# Patient Record
Sex: FEMALE | Race: BLACK OR AFRICAN AMERICAN | NOT HISPANIC OR LATINO
[De-identification: names, ages, dates, MRNs, and addresses within clinical notes are randomized per-mention and may not be internally consistent; named-entity substitution may affect disease eponyms.]

---

## 2020-02-06 DIAGNOSIS — Z01.818 ENCOUNTER FOR OTHER PREPROCEDURAL EXAMINATION: ICD-10-CM

## 2020-02-06 DIAGNOSIS — E66.01 MORBID (SEVERE) OBESITY DUE TO EXCESS CALORIES: ICD-10-CM

## 2020-02-10 ENCOUNTER — APPOINTMENT (OUTPATIENT)
Dept: SURGERY | Facility: CLINIC | Age: 45
End: 2020-02-10

## 2022-05-16 ENCOUNTER — NON-APPOINTMENT (OUTPATIENT)
Age: 47
End: 2022-05-16

## 2022-08-14 ENCOUNTER — EMERGENCY (EMERGENCY)
Facility: HOSPITAL | Age: 47
LOS: 0 days | Discharge: HOME | End: 2022-08-14
Attending: STUDENT IN AN ORGANIZED HEALTH CARE EDUCATION/TRAINING PROGRAM

## 2022-08-14 VITALS
HEIGHT: 68 IN | SYSTOLIC BLOOD PRESSURE: 153 MMHG | TEMPERATURE: 99 F | HEART RATE: 70 BPM | RESPIRATION RATE: 18 BRPM | WEIGHT: 253.09 LBS | OXYGEN SATURATION: 96 % | DIASTOLIC BLOOD PRESSURE: 87 MMHG
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
## 2022-08-14 DIAGNOSIS — M25.571 PAIN IN RIGHT ANKLE AND JOINTS OF RIGHT FOOT: ICD-10-CM

## 2022-08-14 DIAGNOSIS — S99.911A UNSPECIFIED INJURY OF RIGHT ANKLE, INITIAL ENCOUNTER: ICD-10-CM

## 2022-08-14 DIAGNOSIS — I10 ESSENTIAL (PRIMARY) HYPERTENSION: ICD-10-CM

## 2022-08-14 DIAGNOSIS — Z91.018 ALLERGY TO OTHER FOODS: ICD-10-CM
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
## 2022-08-14 PROCEDURE — 73610 X-RAY EXAM OF ANKLE: CPT | Mod: 26,RT

## 2022-08-14 PROCEDURE — 73630 X-RAY EXAM OF FOOT: CPT | Mod: 26,RT

## 2022-08-14 PROCEDURE — 99283 EMERGENCY DEPT VISIT LOW MDM: CPT

## 2022-08-14 NOTE — ED PROVIDER NOTE - CLINICAL SUMMARY MEDICAL DECISION MAKING FREE TEXT BOX
45 y/o F presenting to the ED s/p fall c/o R ankle injury. Vitals stable. She is well appearing in NAD. Her right ankle appears stable with no significant swelling or ecchymosis. Will obtain XR R ankle/foot, low suspicion of fx.

## 2022-08-14 NOTE — ED ADULT NURSE NOTE - OBJECTIVE STATEMENT
2020 11:00 pt presents to ed a&ox3 pt s/p fall down 5 steps lastnight , pt c/o right ankle soreness, no pain currently. pt denies head injury. No swelling or deformity noted

## 2022-08-14 NOTE — ED PROVIDER NOTE - PHYSICAL EXAMINATION
GENERAL: Awake, alert, NAD  HEENT: NC/AT, moist mucous membranes  LUNGS: CTAB, no wheezes or crackles   CARDIAC: RRR, no m/r/g  EXT: No edema, no calf tenderness, 2+ DP pulses, no deformities, no significant swelling or tenderness or ecchymosis  NEURO: A&Ox3. Moving all extremities.  SKIN: Warm and dry. No rash.  PSYCH: Normal affect.

## 2022-08-14 NOTE — ED ADULT NURSE NOTE - NSIMPLEMENTINTERV_GEN_ALL_ED
Implemented All Universal Safety Interventions:  [place] to call system. Call bell, personal items and telephone within reach. Instruct patient to call for assistance. Room bathroom lighting operational. Non-slip footwear when patient is off stretcher. Physically safe environment: no spills, clutter or unnecessary equipment. Stretcher in lowest position, wheels locked, appropriate side rails in place.

## 2022-08-14 NOTE — ED PROVIDER NOTE - IV ALTEPLASE DOOR HIDDEN
Thanks for visiting us today!    Remember these important phone numbers:    [phone] for phone nurses during the day and our nurse answering service at night    [phone]  for scheduling or changing future appointments    [phone] for the Poison Control Center    When leaving a message for our staff, please include:   • the spelling of your child’s full name and date of birth  • your full name and relationship to child  • best phone number and time to reach you   • reason for the call      Is your child signed up for [employer]? If you do this, you can message us rather than playing phone tag! You can also look at labs, pay your bills, and do some scheduling. Go to your own account first. (If you don't have one yet, you can set one up at the website below or at your doctor's office).  Using a web browser (not the phone gómez), on the right hand side of your page, click the button marked \"Request Access to my Child's Records.\" Fill out the information. In a few days your child's information will be linked to your account. It's that simple!! Here is the website for more information:     [email or web address]      If you haven't already liked us on Facebook, please do so!  Just search for \"[facility]\"      --------------------------------------------------------------------------------------------------------------------    Moisturizing ointment/creams - Apply moisturizer to entire body, 2 times daily. The moisturizer should be unscented. Examples include petroleum jelly (Vaseline), Aquaphor, Cetaphil, Eucerin (generic versions of these moisturizers are available) or CeraVe Baby.             
show

## 2022-08-14 NOTE — ED PROVIDER NOTE - NSFOLLOWUPINSTRUCTIONS_ED_ALL_ED_FT
You were seen in the ED for an ankle injury.    Your xray did not show any acute fracture.    You can continue to use muscle relaxer as needed at home. You can also use tylenol or motrin for pain.    Please follow up with your primary care doctor when you return home.    If you experience worsening swelling, pain, inability to walk, return to the ED.

## 2022-08-14 NOTE — ED PROVIDER NOTE - NSDCPRINTRESULTS_ED_ALL_ED
Problem: Falls - Risk of:  Goal: Will remain free from falls  Description: Will remain free from falls  7/14/2021 2131 by Mark Walker RN  Outcome: Ongoing  7/14/2021 1815 by Marion Reynaga RN  Outcome: Ongoing  Goal: Absence of physical injury  Description: Absence of physical injury  7/14/2021 2131 by Mark Walker RN  Outcome: Ongoing  7/14/2021 1815 by Marion Reynaga RN  Outcome: Ongoing     Problem: Skin Integrity:  Goal: Will show no infection signs and symptoms  Description: Will show no infection signs and symptoms  7/14/2021 2131 by Mark Walker RN  Outcome: Ongoing  7/14/2021 1815 by Marion Reynaga RN  Outcome: Ongoing  Goal: Absence of new skin breakdown  Description: Absence of new skin breakdown  7/14/2021 2131 by Mark Walker RN  Outcome: Ongoing  7/14/2021 1815 by Marion Reynaga RN  Outcome: Ongoing     Problem: Nutrition  Goal: Optimal nutrition therapy  7/14/2021 2131 by Mark Walker RN  Outcome: Ongoing  7/14/2021 1815 by Marion Reynaga RN  Outcome: Ongoing  Goal: Understanding of nutritional guidelines  7/14/2021 2131 by Mark Walker RN  Outcome: Ongoing  7/14/2021 1815 by Marion Reynaga RN  Outcome: Ongoing Patient requests all Lab, Cardiology, and Radiology Results on their Discharge Instructions DISPLAY PLAN FREE TEXT

## 2022-08-14 NOTE — ED PROVIDER NOTE - PATIENT PORTAL LINK FT
You can access the FollowMyHealth Patient Portal offered by [facility] by registering at the following website: [email or web address]. By joining [employer] [facility]’s FollowMyHealth portal, you will also be able to view your health information using other applications (apps) compatible with our system.

## 2022-08-14 NOTE — ED PROVIDER NOTE - OBJECTIVE STATEMENT
47 y/o F with PMH HTN presenting to the ED s/p fall. She tripped over 5 stairs last night and twisted her R ankle. She is now endorsing difficulty walking on the R ankle secondary to pain. She has been able to bear weight with some difficulty. States she feels like the muscle is strained. She has been using muscle relaxers with some relief. No numbness, tingling, weakness.

## 2023-08-18 ENCOUNTER — NON-APPOINTMENT (OUTPATIENT)
Age: 48
End: 2023-08-18

## 2023-08-21 ENCOUNTER — EMERGENCY (EMERGENCY)
Facility: HOSPITAL | Age: 48
LOS: 0 days | Discharge: HOME | End: 2023-08-21
Payer: MEDICAID

## 2023-08-21 VITALS
SYSTOLIC BLOOD PRESSURE: 133 MMHG | OXYGEN SATURATION: 99 % | HEART RATE: 76 BPM | TEMPERATURE: 99 F | RESPIRATION RATE: 18 BRPM | DIASTOLIC BLOOD PRESSURE: 88 MMHG

## 2023-08-21 VITALS
DIASTOLIC BLOOD PRESSURE: 90 MMHG | OXYGEN SATURATION: 99 % | HEIGHT: 69 IN | TEMPERATURE: 99 F | WEIGHT: 240.97 LBS | SYSTOLIC BLOOD PRESSURE: 165 MMHG | RESPIRATION RATE: 16 BRPM | HEART RATE: 75 BPM
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
## 2023-08-21 DIAGNOSIS — M25.571 PAIN IN RIGHT ANKLE AND JOINTS OF RIGHT FOOT: ICD-10-CM

## 2023-08-21 DIAGNOSIS — J45.909 UNSPECIFIED ASTHMA, UNCOMPLICATED: ICD-10-CM
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
## 2023-08-21 DIAGNOSIS — I10 ESSENTIAL (PRIMARY) HYPERTENSION: ICD-10-CM

## 2023-08-21 DIAGNOSIS — S82.841A DISPLACED BIMALLEOLAR FRACTURE OF RIGHT LOWER LEG, INITIAL ENCOUNTER FOR CLOSED FRACTURE: ICD-10-CM

## 2023-08-21 DIAGNOSIS — Z79.82 LONG TERM (CURRENT) USE OF ASPIRIN: ICD-10-CM
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
## 2023-08-21 PROCEDURE — 73700 CT LOWER EXTREMITY W/O DYE: CPT | Mod: 26,RT,MG

## 2023-08-21 PROCEDURE — G1004: CPT

## 2023-08-21 PROCEDURE — 73610 X-RAY EXAM OF ANKLE: CPT | Mod: 26,RT

## 2023-08-21 PROCEDURE — 73600 X-RAY EXAM OF ANKLE: CPT | Mod: 26,RT,59,76

## 2023-08-21 PROCEDURE — 99285 EMERGENCY DEPT VISIT HI MDM: CPT

## 2023-08-21 RX ORDER — ASPIRIN/CALCIUM CARB/MAGNESIUM 324 MG
1 TABLET ORAL
Qty: 30 | Refills: 0
Start: 2023-08-21

## 2023-08-21 RX ORDER — IBUPROFEN 200 MG
600 TABLET ORAL ONCE
Refills: 0 | Status: COMPLETED | OUTPATIENT
Start: 2023-08-21 | End: 2023-08-21

## 2023-08-21 RX ADMIN — Medication 600 MILLIGRAM(S): at 13:20

## 2023-08-21 NOTE — ED PROVIDER NOTE - PROVIDER TOKENS
FREE:[LAST:[your pmd in 1-3 days],PHONE:[(   )    -],FAX:[(   )    -]],PROVIDER:[TOKEN:[[id number]],FOLLOWUP:[1-3 Days]]

## 2023-08-21 NOTE — ED PROVIDER NOTE - PHYSICAL EXAMINATION
PHYSICAL EXAM:    GENERAL: Alert, appears stated age, well appearing, non-toxic  SKIN: Warm, pink and dry. MMM.   HEAD: NC, AT, no step offs   EYE: Normal lids/conjunctiva, PERRL, EOMI  ENT: Normal hearing, patent oropharynx  NECK: +supple. No meningismus, or JVD, +Trachea midline. no tenderness/step offs.   Pulm: Bilateral BS, normal resp effort, no wheezes, stridor, or retractions  CV: RRR, no M/R/G, 2+and = radial pulses  Abd: soft, non-tender, non-distended  Mskel: no erythema, cyanosis, edema. no calf tenderness. no spinal ttp. +R b/l malleolus ttp, no foot ttp. no fibular head ttp. +decreased rom of R ankle. 2+ dp/py pulses.   Neuro: AAOx3, no sensory deficits

## 2023-08-21 NOTE — ED PROVIDER NOTE - OBJECTIVE STATEMENT
48 y/o F with PMH HTN, asthma, sent in by [facility] for R ankle fracture seen on xr today.  She relates she tripped and fell 1.5 wks ago and has been walking on the ankle, but the pain/swelling has been getting worse.   no head injury, n/v, ac use, n/v, pain anywhere else, cp, sob, open wounds.

## 2023-08-21 NOTE — CONSULT NOTE ADULT - SUBJECTIVE AND OBJECTIVE BOX
47y Female presents with R ankle pain s/p MF 1 week ago. Community ambulator w/o assistance at baseline. Pt states states that she got dizzy and twisted her ankle and fell. Pt noticed immediate pain but had been walking on it since.  Went to a local urgent care over the weekend but they didn't have XR.  So decided to come to [facility] today. No HS/LOC. No other injuries or complaints. No hx of orthopedic surgeries in the past. Denies CP, SOB, fever, chills, numbness/tingling, weakness or any other complaints.    Vital Signs Last 24 Hrs  T(C): 37.2 (21 Aug 2023 11:57), Max: 37.2 (21 Aug 2023 11:57)  T(F): 99 (21 Aug 2023 11:57), Max: 99 (21 Aug 2023 11:57)  HR: 75 (21 Aug 2023 11:57) (75 - 75)  BP: 165/90 (21 Aug 2023 11:57) (165/90 - 165/90)  BP(mean): --  RR: 16 (21 Aug 2023 11:57) (16 - 16)  SpO2: 99% (21 Aug 2023 11:57) (99% - 99%)    Parameters below as of 21 Aug 2023 11:57  Patient On (Oxygen Delivery Method): room air        PHYSICAL EXAM  General: NAD, Awake and Alert      RLE:  Skin intact  No Ecchymosis/redness/warmth noted  TTP to the lateral ankle.     Able to SLR  Neg Axial Load/Log Roll  FROM Hip/Knee/Ankle  + EHL/FHL/GS/TA  SILT Tib/SPN/DPN/Sural/Saph  DP/PT Palpable  No Calf TTP  Compartments soft compressible       IMAGING:  XR : R ankle:  Moore B type fx, slight lateral translation of the talus. Stress view demonstrates increased gap.    CT : pending.     Assessment/Plan:  47y Female with R ankle unstable Moore B Fx.    -Placed in a Trilam splint.    -Pain control as needed  -DVT ppx:  ASA 325mg daily  -RLE NWB  - Ortho stable for dc  - No acute orthopedic surgical intervention  - Follow up with Dr Brown in office within 1 week. Please call office for appointment.   -Will discuss with attending, and advise if plan changes

## 2023-08-21 NOTE — ED PROVIDER NOTE - NSDCPRINTRESULTS_ED_ALL_ED
Diagnosis: upper respiratory infection.   Patient Education & Instructions:   Drink plenty of fluids and stay adequately hydrated.    Use OTC medication: Flonase nasal spray as directed on the package USE FOR 2 WEEKS ONLY . Mucinex (blue box) as directed on the package. STOP TAKING ADVIL COLD AND SINUS MEDICATION AND STOP TAKING BENADRYL .    You have an illness that is caused by a virus and is not treatable by antibiotic.    Antibiotics are not indicated, symptoms may last 7-14 days.    Sit in a steamy bathroom for about 20 min. when having difficulty breathing.    - Antibiotics are not indicated for your condition at this time. Prescribing antibiotics for illnesses that are not bacterial, will not lessen the severity of your illness and may be harmful.  -When using a topical nasal steroid, it may take up to 2 weeks of use before symptoms are completely relieved. Discontinue use if you experience new or increased nasal bleeding. Instructed on proper use of nasal spray. Reviewed correct way to use nasal spray.  -Take acetaminophen for pain and/or fever relief, as directed on the package.  -Stay hydrated. Drink plenty of clear fluids, especially water.  - Saline gargles (1/2 tsp salt dissolved in 8oz warm water) at least four times a day, throat lozenges (do not give to children less than 7).  - Saline nose drops or nasal sprays may be helpful to relieve nasal or sinus congestion and remove nasal drainage. Use as directed on package.  -Sleeping with head of bed elevated on at least 2 pillows (if you can tolerate it), which may help to lessen night time cough.  - Symptoms should improve within 3 - 4 days. Complete symptom relief may take 7-14 days or longer.  -Cough or sneeze into a tissue or into your sleeve.   -Wash hands often and especially after sneezing or coughing.      Follow Up at Higher Level of Care  -Seek immediate medical care at the emergency room if new signs and symptoms develop such as stomach  pain, severe headache, fever of 101.3F degrees or higher, neck stiffness, fever returns after it has resolved, shortness of breath or chest pain.         Patient requests all Lab, Cardiology, and Radiology Results on their Discharge Instructions

## 2023-08-21 NOTE — ED ADULT TRIAGE NOTE - AS TEMP SITE
Discharge Note:     All discharge instructions given at this time as well as all patient belongings returned to patient. Pt denies any further questions regarding discharge at this time. Pt given also given discharge instructions/restrictions and medication handouts regarding all discharge medications and side effects. Pt denies any further issues at this time. Pt ambulated out to front discharge doors, escorted by RN, at this time. Pt left premises without any issues in private vehicle at this time. oral

## 2023-08-21 NOTE — ED ADULT NURSE NOTE - OBJECTIVE STATEMENT
48 yo female, A&Ox4, BIBEMS from urgent care c/o R ankle pain. Per patient, she fell on 8/12 scrapping both her knees and "twisting" her right ankle. Patient reports feeling a slight "twinge" in her ankle but was not overly concerned at the time. She reports pain worsened within the last 2 days. Pt. rates scale 5/10 on the pain scale presently.

## 2023-08-21 NOTE — ED PROVIDER NOTE - CARE PROVIDER_API CALL
your pmd in 1-3 days,   Phone: (   )    -  Fax: (   )    -  Follow Up Time:     Gino Brown  Orthopaedic Surgery  [address] [place] [address]  Phone: [phone]  Fax: [phone]  Follow Up Time: 1-3 Days

## 2023-08-21 NOTE — ED PROVIDER NOTE - PATIENT PORTAL LINK FT
You can access the FollowMyHealth Patient Portal offered by [facility] by registering at the following website: [email or web address]. By joining [employer]’s FollowMyHealth portal, you will also be able to view your health information using other applications (apps) compatible with our system.

## 2023-08-21 NOTE — ED ADULT TRIAGE NOTE - CHIEF COMPLAINT QUOTE
BIBA from urgent care for right distal fibula fracture, as per emt, patient fell a week and a half ago and has been having swelling to the right ankle

## 2023-08-21 NOTE — ED PROVIDER NOTE - CLINICAL SUMMARY MEDICAL DECISION MAKING FREE TEXT BOX
+R ankle ruchi.   splinted by ortho.   Reviewed all results and necessity for follow up. Counseled on red flags and to return for them. Patient appears well on discharge.

## 2023-12-21 NOTE — ED ADULT NURSE NOTE - SUICIDE SCREENING QUESTION 2
Please drink plenty of fluids.  Please get plenty of rest.  Please return here or go to the Emergency Department for any concerns or worsening of condition.  If you were prescribed antiviral and Medrol Dosepak, please take them to completion.  Use Flonase nasal spray as directed and take daily Zyrtec for the next 10 days.  Recommended for patient to use albuterol inhaler at least twice today, starting tomorrow she can use it as needed.  Take Tessalon Perles up to 3 times a day as needed for cough.  Recommended for patient to drink hot tea with honey. Consider eating softer foods such as soup and broth for the next couple of days to prevent further throat irritation. Recommended for patient to refrain from acidic foods (such as tomatoes or caffeine) to prevent throat irritation for the next couple of days.   If you do not have Hypertension or any history of palpitations, it is ok to take over the counter Sudafed or Mucinex D or Allegra-D or Claritin-D or Zyrtec-D.  If you do take one of the above, it is ok to combine that with plain over the counter Mucinex or Allegra or Claritin or Zyrtec.  If for example you are taking Zyrtec -D, you can combine that with Mucinex, but not Mucinex-D.  If you are taking Mucinex-D, you can combine that with plain Allegra or Claritin or Zyrtec.   If you do have Hypertension or palpitations, it is safe to take Coricidin HBP for relief of sinus symptoms.  If not allergic, please take over the counter Tylenol (Acetaminophen) and/or Motrin (Ibuprofen) as directed for control of pain and/or fever.  Please follow up with your primary care doctor or specialist as needed.    If you  smoke, please stop smoking.    
No
